# Patient Record
Sex: FEMALE | Race: WHITE
[De-identification: names, ages, dates, MRNs, and addresses within clinical notes are randomized per-mention and may not be internally consistent; named-entity substitution may affect disease eponyms.]

---

## 2018-12-06 ENCOUNTER — HOSPITAL ENCOUNTER (EMERGENCY)
Dept: HOSPITAL 88 - FSED | Age: 73
Discharge: LEFT BEFORE DISCHARGE | End: 2018-12-06
Payer: MEDICARE

## 2018-12-06 VITALS — SYSTOLIC BLOOD PRESSURE: 148 MMHG | DIASTOLIC BLOOD PRESSURE: 70 MMHG

## 2018-12-06 VITALS — WEIGHT: 206.44 LBS | BODY MASS INDEX: 35.24 KG/M2 | HEIGHT: 64 IN

## 2018-12-06 DIAGNOSIS — R06.00: Primary | ICD-10-CM

## 2018-12-06 DIAGNOSIS — I50.9: ICD-10-CM

## 2018-12-06 DIAGNOSIS — Z86.718: ICD-10-CM

## 2018-12-06 DIAGNOSIS — I10: ICD-10-CM

## 2018-12-06 DIAGNOSIS — E05.90: ICD-10-CM

## 2018-12-06 DIAGNOSIS — I48.91: ICD-10-CM

## 2018-12-06 DIAGNOSIS — R05: ICD-10-CM

## 2018-12-06 DIAGNOSIS — J15.9: ICD-10-CM

## 2018-12-06 PROCEDURE — 96374 THER/PROPH/DIAG INJ IV PUSH: CPT

## 2018-12-06 PROCEDURE — 93005 ELECTROCARDIOGRAM TRACING: CPT

## 2018-12-06 PROCEDURE — 87071 CULTURE AEROBIC QUANT OTHER: CPT

## 2018-12-06 PROCEDURE — 80053 COMPREHEN METABOLIC PANEL: CPT

## 2018-12-06 PROCEDURE — 80048 BASIC METABOLIC PNL TOTAL CA: CPT

## 2018-12-06 PROCEDURE — 87400 INFLUENZA A/B EACH AG IA: CPT

## 2018-12-06 PROCEDURE — 71046 X-RAY EXAM CHEST 2 VIEWS: CPT

## 2018-12-06 PROCEDURE — 85379 FIBRIN DEGRADATION QUANT: CPT

## 2018-12-06 PROCEDURE — 85610 PROTHROMBIN TIME: CPT

## 2018-12-06 PROCEDURE — 83880 ASSAY OF NATRIURETIC PEPTIDE: CPT

## 2018-12-06 PROCEDURE — 80076 HEPATIC FUNCTION PANEL: CPT

## 2018-12-06 PROCEDURE — 96375 TX/PRO/DX INJ NEW DRUG ADDON: CPT

## 2018-12-06 PROCEDURE — 87040 BLOOD CULTURE FOR BACTERIA: CPT

## 2018-12-06 PROCEDURE — 85025 COMPLETE CBC W/AUTO DIFF WBC: CPT

## 2018-12-06 PROCEDURE — 87205 SMEAR GRAM STAIN: CPT

## 2018-12-06 PROCEDURE — 84484 ASSAY OF TROPONIN QUANT: CPT

## 2018-12-06 PROCEDURE — 99283 EMERGENCY DEPT VISIT LOW MDM: CPT

## 2018-12-06 NOTE — XMS REPORT
Clinical Summary

                             Created on: 2018



Kathie Ibarra Carmelita

External Reference #: [id number]

: 1945

Sex: Female



Demographics







                          Address                   [address] [place]  [address]

 

                          Home Phone                [phone]

 

                          Preferred Language        English

 

                          Marital Status            Unknown

 

                          Sabianism Affiliation     Taoist

 

                          Race                      White

 

                          Ethnic Group              Non-





Author







                          Author                    MARIA FERNANDA [employer]

 

                          Organization              [facility]

 

                          Address                   Unknown

 

                          Phone                     Unavailable







Support







                Name            Relationship    Address         Phone

 

                    Keisha Ndiaye    [employer]                [address] [place]  [address]                      [phone]







Care Team Providers







                    Care Team Member Name    Role                Phone

 

                    Bc Breaux MD    PCP                 [phone]







Allergies







                                        Comments



                 Active Allergy     Reactions       Severity        Noted Date 

 

                                        



Hives



                           Morphine Sulfate          High  

 

                                        



shortness of breath



                                         Nitrofurantoin    



                                         Macrocrystalline    







Medications







                          End Date                  Status



              Medication     Sig          Dispensed     Refills      Start  



                                         Date  

 

                                                    Active



                 metOLazone (ZAROXOLYN)     Take 2.5 mg      0                 



                     2.5 MG tabletIndications:     by mouth as         7  



                           Essential hypertension     needed (Once     



                           with goal blood pressure     weekly per     



                           less than 130/80          Dr. Henriquez)     



                                         .     

 

                                                    Active



                 levalbuterol (XOPENEX)     Take 3 mLs by      0               02/10/201  



                     1.25 mg/3 mL nebulizer     nebulization        8  



                           solution                  3 (three)     



                                         times daily     



                                         as needed.     

 

                          2019                Active



              levalbuterol (XOPENEX     Inhale 2     1 Inhaler     0              



                     HFA) 45 mcg/actuation     puffs by            8  



                           inhalerIndications:       mouth via     



                           Bronchitis                inhaler every     



                                         6 (six) hours     



                                         as needed for     



                                         Wheezing.     

 

                                                    Active



              nebulizers (NEBULIZER)     Nebulizer and     1 each       0              



                     MiscIndications:     mask for            8  



                           Bronchitis                levalbuterol.     

 

                                                    Active



                     losartan (COZAAR) 50 MG     Take 50 mg by       0   



                           tablet                    mouth nightly     



                                         .     

 

                          2019                Active



              rosuvastatin (CRESTOR) 10     Take 1 tablet     90 tablet     3              



                     MG tablet           (10 mg total)       8  



                                         by mouth     



                                         nightly.     

 

                                                    Active



              warfarin (COUMADIN) 5 MG     TAKE 1 TABLET     90 tablet     3              



                     tabletIndications:     BY MOUTH            8  



                           Essential hypertension     DAILY     



                                         with goal blood pressure      



                                         less than 130/80      

 

                                                    Active



              levothyroxine (SYNTHROID,     TAKE 1 TABLET     90 tablet     2              



                     LEVOTHROID) 50 MCG tablet     BY MOUTH            8  



                                         EVERY MORNING     



                                         ON AN EMPTY     



                                         STOMACH     

 

                                                    Active



              furosemide (LASIX) 40 MG     TAKE 2       180 tablet     1              



                     tablet              TABLETS BY          8  



                                         MOUTH  DAILY     

 

                                                    Active



              spironolactone     TAKE 1 TABLET     90 tablet     1              



                     (ALDACTONE) 25 MG     BY MOUTH            8  



                           tabletIndications:        DAILY     



                                         Essential hypertension      



                                         with goal blood pressure      



                                         less than 130/80      

 

                                                    Active



                     FUROSEMIDE ORAL     Take 60 mg by       0   



                                         mouth every     



                                         morning In     



                                         addition to     



                                         the 40 mg at     



                                         night. .     

 

                                                    Active



              carvedilol (COREG) 25 MG     TAKE 2       360 tablet     1              



                     tablet              TABLETS BY          8  



                                         MOUTH 2     



                                         TIMES DAILY     



                                         WITH     



                                         BREAKFAST     



                                         AND DINNER.     

 

                                                    Active



              traZODone (DESYREL) 100     TAKE 1 TABLET     90 tablet     1              



                     MG tablet           BY MOUTH            8  



                                         NIGHTLY     

 

                          2017                Discontinued



              carvedilol (COREG) 25 MG     Take 2       360 tablet     3              



                     tablet              tablets (50         6  



                                         mg total) by     



                                         mouth 2 (two)     



                                         times daily     



                                         with     



                                         breakfast and     



                                         dinner.     

 

                          2018                Discontinued



              furosemide (LASIX) 40 MG     Take 2       180 tablet     3              



                     tablet              tablets (80         7  



                                         mg total) by     



                                         mouth daily.     

 

                          2018                Discontinued



              levothyroxine (SYNTHROID,     Take 1 tablet     90 tablet     3              



                     LEVOTHROID) 50 MCG tablet     (50 mcg             7  



                                         total) by     



                                         mouth Every     



                                         morning on an     



                                         empty     



                                         stomach.     

 

                          2018                Discontinued



              warfarin (COUMADIN) 5 MG     Take 1 tablet     90 tablet     3            10/02/201  



                     tabletIndications:     (5 mg total)        7  



                           Essential hypertension     by mouth     



                           with goal blood pressure     daily.     



                                         less than 130/80      

 

                          2018                Discontinued



              traZODone (DESYREL) 100     Take 1 tablet     90 tablet     3            10/02/201  



                     MG tabletIndications:     (100 mg             7  



                           Essential hypertension     total) by     



                           with goal blood pressure     mouth     



                           less than 130/80          nightly.     

 

                          2018                Discontinued



              spironolactone     Take 1 tablet     90 tablet     1            10/02/201  



                     (ALDACTONE) 25 MG     (25 mg total)       7  



                           tabletIndications:        by mouth     



                           Essential hypertension     daily.     



                                         with goal blood pressure      



                                         less than 130/80      

 

                          2018                Discontinued



              losartan (COZAAR) 100 MG     Take 1 tablet     90 tablet     3            10/02/201  



                     tabletIndications:     (100 mg             7  



                           Essential hypertension     total) by     



                           with goal blood pressure     mouth daily.     



                                         less than 130/80      

 

                          10/02/2018                



              budesonide-formoterol     Inhale 2     2 Inhaler     12           10/02/201  



                     (SYMBICORT) 80-4.5     puffs by            7  



                           mcg/actuation             mouth via     



                           inhalerIndications:       inhaler 2     



                           Essential hypertension     (two) times     



                           with goal blood pressure     daily.     



                                         less than 130/80      

 

                          2018                Discontinued



              carvedilol (COREG) 25 MG     TAKE 2       360 tablet     2              



                     tablet              TABLETS BY          7  



                                         MOUTH 2     



                                         TIMES DAILY     



                                         WITH     



                                         BREAKFAST     



                                         AND DINNER.     

 

                          2018                



                 cefuroxime (CEFTIN) 500     Take 1 tablet      0               02/10/201  



                     MG tablet           by mouth 2          8  



                                         (two) times     



                                         daily.     

 

                          2018                



              predniSONE (DELTASONE) 20     Take 2       10 tablet     0              



                     MG tabletIndications:     tablets (40         8  



                           Bronchitis                mg total) by     



                                         mouth daily     



                                         for 5 days.     

 

                          2018                Discontinued



              levothyroxine (SYNTHROID,     TAKE 1 TABLET     90 tablet     2              



                     LEVOTHROID) 50 MCG tablet     BY MOUTH            8  



                                         EVERY MORNING     



                                         ON AN EMPTY     



                                         STOMACH     

 

                          2018                Discontinued



              furosemide (LASIX) 40 MG     TAKE 2       180 tablet     2              



                     tablet              TABLETS BY          8  



                                         MOUTH  DAILY     

 

                          2018                Discontinued



              spironolactone     TAKE 1 TABLET     90 tablet     2              



                     (ALDACTONE) 25 MG     BY MOUTH            8  



                           tabletIndications:        DAILY     



                                         Essential hypertension      



                                         with goal blood pressure      



                                         less than 130/80      

 

                          2018                



              cephalexin (KEFLEX) 500     Take 1       21 capsule     0              



                     MG capsule          capsule (500        8  



                                         mg total) by     



                                         mouth 3     



                                         (three) times     



                                         daily for 7     



                                         days.     

 

                          2018                



              bacitracin 500 unit/gram     Apply        120 g        0              



                     ointment            topically 2         8  



                                         (two) times     



                                         daily for 10     



                                         days.     

 

                          2018                Discontinued



              carvedilol (COREG) 25 MG     TAKE 2       360 tablet     1              



                     tablet              TABLETS BY          8  



                                         MOUTH 2     



                                         TIMES DAILY     



                                         WITH     



                                         BREAKFAST     



                                         AND DINNER.     

 

                          2018                Discontinued



              traZODone (DESYREL) 100     TAKE 1 TABLET     90 tablet     1              



                     MG tabletIndications:     BY MOUTH            8  



                           Essential hypertension     NIGHTLY     



                                         with goal blood pressure      



                                         less than 130/80      

 

                          10/18/2018                



              cefUROXime (CEFTIN) 250     Take 1 tablet     20 tablet     0            10/08/201  



                     MG tablet           (250 mg             8  



                                         total) by     



                                         mouth 2 (two)     



                                         times daily     



                                         for 10 days.     







Active Problems







                                        Patient Care Coordination Note

 

                                        



Dec 2014 new pleural effusion, more on the right, improved after 20 pound 
diuresis

Kids are medical POA, some are here. 

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

Home machine. Some self mgt.

Daughter is General Surgeon in [place] 











 



                           Problem                   Noted Date

 

 



                           Pure hypercholesterolemia     2018

 

 



                                         Overview:



                                          Oct 2017, had been good prior to that.



                                         Agreed to start rosuvastatin May 2018

 

 



                           Bilateral edema of lower extremity     2017

 

 



                                         Overview:



                                         Right heart failure, Furosemide. Dr. Henriquez sent to Dr. Rivera, no



                                         procedure, did not look like would be help. She does not want lymphedema



                                         clinic.

 

 



                           Anemia                    10/12/2015

 

 



                                         Overview:



                                         Low platelets 113, low lymphocytes, WBC 3.91, HB 10.4. Dr. Hernadez believes



                                         is following. Not low on iron.



                                         Bled during turbinate procedure on Lovenox

 

 



                           Pancytopenia              2015

 

 



                                         Overview:



                                         2015, prev anemia and mild thrombocytopenia, now with pancytopenia.

 

 



                           Abnormal CXR              06/15/2015

 

 



                                         Overview:



                                         Dr. Jackson following right middle lobe opacity. Follow up pending.

 

 



                           Venous insufficiency      2015

 

 



                                         Overview:



                                         S/p bilateral venous sclerotherapy Bianchi Vein, see media. Some



                                         persistent retrograde flow

 

 



                           Right heart failure due to pulmonary hypertension     2015

 

 



                                         Overview:



                                         Normal EF and NST Dr. Henriquez 2015



                                         EF 50-55%. Marked Pulm HTN<



                                         Severe tricuspid regurgitation.

 

 



                           Proteinuria               2015

 

 



                                         Overview:



                                         296 2015

 

 



                           Pleural effusion, bilateral     2014

 

 



                                         Overview:



                                         New moderate right pleural effusion with right retrocardiac opacity



                                         and no mediastinal shift. Slight enlargement of small left pleural



                                         effusion. No pulmonary edema or pneumothorax. Normal



                                         cardiomediastinal contours. Left-sided dual-lead pacemaker is



                                         unchanged.

 

 



                           Multinodular goiter       2014

 

 



                                         Overview:



                                         [place] 

 

 



                           Pulmonary hypertension, primary     2013

 

 



                                         Overview:



                                         Apparent primary followed by Dr. Henriquez, Prev pulmonary emboli. History of



                                         pneumonia  [facility]



                                         Following expectantly. Overall function is very good.

 

 



                           Systolic murmur           2013

 

 



                                         Overview:



                                         2/6 STEPHANIA

 

 



                           Bilateral renal cysts     2013

 

 



                           Essential hypertension with goal blood pressure less than 130/80     2012

 

 



                                         Warfarin anticoagulation 

 

 



                                         Overview:



                                         History of PEs, mangaged  by [facility]

 

 



                                         S/P ablation of atrial fibrillation 

 

 



                                         Overview:



                                         Still in a-fib 2013 Dr. Rosenberg, no chronic A-fib, Carvedilol



                                         Dr.. Henriquez,



                                         Pacemaker.

 

 



                                         Pacemaker 

 

 



                                         Overview:



                                         Dr. Rosenberg







Resolved Problems







  



                     Problem             Noted Date          Resolved Date

 

  



                     Bronchitis          2018          10/08/2018

 

 



                                         Overview:



                                         2018 ([place]).







Encounters







                          Care Team                 Description



                     Date                Type                Specialty  

 

                                        



Bc Breaux MD                   



                     2018          Refill              Internal Medicine  

 

                                        



Bc Breaux MD                   



                     2018          Orders Only         Internal Medicine  

 

                                        



Bc Breaux MD                   



                     10/30/2018          Orders Only         Internal Bc Sow MD                  Coagulation Disorder



                     10/23/2018          Telephone           Internal Bc Sow MD                   



                     10/23/2018          Orders Only         Internal Bc Sow MD                  CC (OV notes and labs sent )



                     10/19/2018          Telephone           Internal Bc Sow MD                   



                     10/16/2018          Orders Only         Internal Bc Sow MD                  Rx



                     10/09/2018          Telephone           Internal Medicine  

 

                                        



Bc Breaux MD                   



                     10/09/2018          Orders Only         Internal Bc Sow MD                  Pulmonary hypertension, primary (HCC) (Primary Dx); 

Need for influenza vaccination; 

Annual physical exam; 

Pure hypercholesterolemia; 

Right heart failure due to pulmonary hypertension (HCC); 

S/P ablation of atrial fibrillation; 

Warfarin anticoagulation; 

Essential hypertension with goal blood pressure less than 130/80; 

Multinodular goiter; 

Encounter for long-term (current) use of medications; 

Bilateral edema of lower extremity



                     10/08/2018          Office Visit        Internal Bc Sow MD                   



                     10/02/2018          Orders Only         Internal Bc Sow MD                  INR



                     10/02/2018          Telephone           Internal Bc Sow MD                   



                     2018          Orders Only         Bc Eddy MD                  Coagulation Disorder



                     2018          Telephone           Internal Bc Sow MD                   



                     2018          Orders Only         Internal Bc Sow MD                  Coagulation Disorder



                     2018          Telephone           Internal Medicine  

 

                                        



Favio Rivera MD               Leg mass, left



                     2018          Hospital            Radiology  



                                         Encounter   

 

                                        



Favio Rivera MD               Leg mass, left (Primary Dx)



                           2018                Outside Orders   

 

                                        



Bc Breaux MD                  Coagulation Disorder



                     2018          Telephone           Internal Bc Sow MD                   



                     2018          Orders Only         Internal Medicine  

 

                                        



Bc Breaux MD                   



                     2018          Orders Only         Internal Medicine  

 

                                        



Bc Breaux MD                   



                     2018          Orders Only         Internal Medicine  

 

                                        



Bc Breaux MD                   



                     2018          Orders Only         Internal Medicine  

 

                                        



Bc Breaux MD                  Essential hypertension with goal blood pressure less than

 130/80



                     2018          Refill              Internal Medicine  

 

                                        



Bc Breaux MD                   



                     2018          Orders Only         Internal Medicine  

 

                                        



Bc Breaux MD                  Coagulation Disorder



                     2018          Telephone           Internal Medicine  

 

                                        



Bc Breaux MD                   



                     2018          Orders Only         Internal Medicine  

 

                                        



Bc Breaux MD                   



                     2018          Orders Only         Internal Medicine  

 

                                        



Bc Breaux MD                   



                     07/10/2018          Orders Only         Internal Medicine  

 

                                        



Bc Breaux MD                  Coagulation Disorder



                     07/10/2018          Telephone           Internal Bc Sow MD                  Coagulation Disorder (OUT OF range INR)



                     2018          Telephone           Internal Bc Sow MD                   



                     2018          Orders Only         Internal Medicine  

 

                                        



Bc Breaux MD                  Right heart failure due to pulmonary hypertension (HCC) (Primary

 Dx); 

Warfarin anticoagulation; 

Pure hypercholesterolemia; 

S/P ablation of atrial fibrillation; 

Pulmonary hypertension, primary (HCC); 

Venous insufficiency; 

Bronchitis



                     2018          Office Visit        Internal Medicine  

 

                                        



Bc Breaux MD                  Results (INR results   )



                     2018          Telephone           Internal Bc Sow MD                   



                     2018          Orders Only         Internal Bc Sow MD                   



                     06/15/2018          Orders Only         Internal Bc Sow MD                  Cough; URI



                     2018          Telephone           Internal Bc Sow MD                   



                     2018          Orders Only         Internal Medicine  

 

                                        



Bc Breaux MD                  Coagulation Disorder (INR)



                     2018          Telephone           Internal Medicine  

 

                                        



Bc Breaux MD                   



                     2018          Orders Only         Internal Bc Sow MD                   



                     2018          Orders Only         Internal Medicine  

 

                                        



Bc Breaux MD                  Essential hypertension with goal blood pressure less than

 130/80



                     2018          Refill              Internal Bc Sow MD                   



                     05/15/2018          Orders Only         Internal Medicine  

 

                                        



Bc Breaux MD                   



                     2018          Orders Only         Internal Medicine  

 

                                        



Bc Breaux MD                  Coagulation Disorder



                     2018          Telephone           Internal Medicine  

 

                                        



Bc Breaux MD                   



                     2018          Orders Only         Internal Medicine  

 

                                        



Bc Breaux MD                   



                     2018          Orders Only         Internal Medicine  

 

                                        



Bc Breaux MD                  records request (cardio work up )



                     2018          Telephone           Internal Medicine  

 

                                        



Bc Breaux MD                   



                     2018          Orders Only         Internal Medicine  

 

                                        



Bc Breaux MD                   



                     04/10/2018          Orders Only         Internal Medicine  

 

                                        



Bc Breaux MD                  Coagulation Disorder



                     2018          Telephone           Internal Medicine  

 

                                        



Bc Breaux MD                   



                     2018          Orders Only         Internal Medicine  

 

                                        



Bc Breaux MD                  Pulmonary hypertension, primary (HCC) (Primary Dx); 

S/P ablation of atrial fibrillation; 

Pure hypercholesterolemia; 

Warfarin anticoagulation; 

Right heart failure due to pulmonary hypertension (HCC); 

Venous insufficiency; 

Other iron deficiency anemia



                     2018          Office Visit        Internal Medicine  

 

                                        



Baljeet Gaston MD                       Diphtheria-tetanus-pertussis (DTP) vaccination (Primary Dx); 

Laceration of left lower leg, initial encounter; 

History of CHF (congestive heart failure)



                     2018          Emergency           Emergency Medicine  

 

                                        



Bc Breaux MD                  Concern about patient (Concerned about patient)



                     2018          Telephone           Internal Medicine  

 

                                        



Bc Breaux MD                   



                     2018          Orders Only         Internal Medicine  

 

                                        



Bc Breaux MD                   



                     2018          Orders Only         Internal Medicine  

 

                                        



Bc Breaux MD                  Essential hypertension with goal blood pressure less than

 130/80



                     2018          Refill              Internal Medicine  

 

                                        



Bc Breaux MD                   



                     2018          Orders Only         Internal Medicine  

 

                                        



Bc Breaux MD                   



                     2018          Orders Only         Internal Medicine  

 

                                        



Bc Breaux MD                   



                     2018          Orders Only         Internal Medicine  

 

                                        



Catarina Fox MD                  Bronchitis (Primary Dx)



                     2018          Office Visit        Internal Medicine  

 

                                        



Bc Breaux MD                  Rx (Rx ); NEW NEBULIZER MACHINE



                     2018          Telephone           Internal Medicine  

 

                                        



Bc Breaux MD                   



                     2018          Orders Only         Internal Medicine  

 

                                        



Bc Breaux MD                  INR



                     2018          Telephone           Internal Medicine  

 

                                        



Bc Breaux MD                   



                     2018          Orders Only         Internal Medicine  

 

                                        



Bc Breaux MD                   



                     2018          Orders Only         Internal Medicine  

 

                                        



Bc Breaux MD                   



                     2018          Orders Only         Internal Medicine  

 

                                        



Bc Breaux MD                   



                     2018          Orders Only         Internal Medicine  

 

                                        



Bc Breaux MD                  Coagulation Disorder (INR out of range 1.9)



                     2018          Telephone           Internal Bc Sow MD                   



                     2018          Orders Only         Internal Medicine  

 

                                        



Bc Breaux MD                   



                     2017          Orders Only         Internal Bc Sow MD                   



                     2017          Refill              Internal Bc Sow MD                   



                     2017          Orders Only         Internal Medicine  

 

                                        



Bc Breaux MD                   



                     2017          Orders Only         Internal Medicine  



after 2017



Immunizations







  



                     Name                Dates Previously Given     Next Due

 

  



                           Influenza High Dose       10/08/2018, 10/02/2017 



                                         Preservative Free IM  

 

  



                           Influenza, High Dose      10/01/2014 



                                         Seasonal  

 

  



                           Pneumococcal Conjugate     10/02/2017 



                                         (Prevnar) 13-Valent  

 

  



                           Pneumococcal              2012 



                                         Polysaccharide  



                                         (Pneumovax)  

 

  



                           Tdap                      2018 







Family History







   



                 Relation        Name            Status          Comments

 

   



                     Brother                         COPD



                                         (Age 70) 

 

   



                     Daughter            Alive               Healthy

 

   



                     Father                          Stroke



                                         (Age 65) 

 

   



                     Mother                          Stroke



                                         (Age 57) 

 

   



                     Son                 Alive               Healthy







Social History







                                        Date



                 Tobacco Use     Types           Packs/Day       Years Used 

 

                                         



                                         Never Smoker    

 

    



                                         Smokeless Tobacco: Never   



                                         Used   









   



                 Alcohol Use     Drinks/Week     oz/Week         Comments

 

   



                                         No   









 



                           Sex Assigned at Birth     Date Recorded

 

 



                                         Not on file 









                                        Industry



                           Job Start Date            Occupation 

 

                                        Not on file



                           Not on file               Not on file 









                                        Travel End



                           Travel History            Travel Start 

 





                                         No recent travel history available.







Last Filed Vital Signs







                                        Time Taken



                           Vital Sign                Reading 

 

                                        10/08/2018 10:40 AM CDT



                           Blood Pressure            106/58 

 

                                        10/08/2018 10:40 AM CDT



                           Pulse                     78 

 

                                        10/08/2018 10:40 AM CDT



                           Temperature               36.5 C (97.7 F) 

 

                                        2018 12:55 PM CDT



                           Respiratory Rate          20 

 

                                        10/08/2018 10:40 AM CDT



                           Oxygen Saturation         99% 

 

                                        -



                           Inhaled Oxygen            - 



                                         Concentration  

 

                                        10/08/2018 10:40 AM CDT



                           Weight                    89.9 kg (198 lb 4.8 oz) 

 

                                        10/08/2018 10:40 AM CDT



                           Height                    162.6 cm (5' 4") 

 

                                        10/08/2018 10:40 AM CDT



                           Body Mass Index           34.04 







Plan of Treatment







                          Care Team                 Description



                     Date                Type                Specialty  

 

                                        



Bc Breaux MD



[address] [id number] [phone] (Fax)                       



                     04/15/2019          Office Visit        Internal Medicine  









   



                 Health Maintenance     Due Date        Last Done       Comments

 

   



                     INFLUENZA VACCINE     Completed           10/08/2018, 10/02/2017 







Procedures







                                        Comments



                 Procedure Name     Priority        Date/Time       Associated Diagnosis 

 

                                        



Results for this procedure are in the results section.



                     PROTHROMBIN TIME/INR     Routine             2018  

 

                                        



Results for this procedure are in the results section.



                     PROTHROMBIN TIME/INR     Routine             10/30/2018  

 

                                        



Results for this procedure are in the results section.



                     PROTHROMBIN TIME/INR     Routine             10/23/2018  

 

                                        



Results for this procedure are in the results section.



                     PROTHROMBIN TIME/INR     Routine             10/16/2018  

 

                                        



Results for this procedure are in the results section.



                     PROTHROMBIN TIME/INR     Routine             10/09/2018  

 

                                        



Results for this procedure are in the results section.



                     MICROSCOPIC EXAMINATION     Routine             10/08/2018  



                                         11:26 AM CDT  

 

                                        



Results for this procedure are in the results section.



                 UA/M W/RFLX CULTURE, ROUT     AP Routine      10/08/2018      Pulmonary hypertension, 



                           11:26 AM CDT              primary (HCC) 



                                         Pure hypercholesterolemia 



                                         Multinodular goiter 



                                         Encounter for long-term 



                                         (current) use of 



                                         medications 

 

                                        



Results for this procedure are in the results section.



                 VITAMIN D, 25-HYDROXY     Routine         10/08/2018      Pulmonary hypertension, 



                           11:26 AM CDT              primary (HCC) 



                                         Pure hypercholesterolemia 



                                         Multinodular goiter 



                                         Encounter for long-term 



                                         (current) use of 



                                         medications 

 

                                        



Results for this procedure are in the results section.



                 LIPID PANEL     Routine         10/08/2018      Pulmonary hypertension, 



                           11:26 AM CDT              primary (HCC) 



                                         Pure hypercholesterolemia 



                                         Multinodular goiter 



                                         Encounter for long-term 



                                         (current) use of 



                                         medications 

 

                                        



Results for this procedure are in the results section.



                 HGB A1C WITH EAG ESTIMA     Routine         10/08/2018      Pulmonary hypertension, 



                           11:26 AM CDT              primary (HCC) 



                                         Pure hypercholesterolemia 



                                         Multinodular goiter 



                                         Encounter for long-term 



                                         (current) use of 



                                         medications 

 

                                        



Results for this procedure are in the results section.



                 CBC W/PLT COUNT & AUTO     Routine         10/08/2018      Pulmonary hypertension, 



                     DIFFERENTIAL        11:26 AM CDT        primary (HCC) 



                                         Pure hypercholesterolemia 



                                         Multinodular goiter 



                                         Encounter for long-term 



                                         (current) use of 



                                         medications 

 

                                        



Results for this procedure are in the results section.



                 COMPREHENSIVE METABOLIC     Routine         10/08/2018      Pulmonary hypertension, 



                     PANEL               11:26 AM CDT        primary (HCC) 



                                         Pure hypercholesterolemia 



                                         Multinodular goiter 



                                         Encounter for long-term 



                                         (current) use of 



                                         medications 

 

                                        



Results for this procedure are in the results section.



                     PROTHROMBIN TIME/INR     Routine             10/02/2018  

 

                                         



                     PROTHROMBIN TIME/INR     Routine             2018  

 

                                        



Results for this procedure are in the results section.



                     PROTHROMBIN TIME/INR     Routine             2018  

 

                                        



Results for this procedure are in the results section.



                     POCT-CREATININE     Routine             2018  



                                         2:26 PM CDT  

 

                                        



Results for this procedure are in the results section.



                     PROTHROMBIN TIME/INR     Routine             2018  

 

                                        



Results for this procedure are in the results section.



                     PROTHROMBIN TIME/INR     Routine             2018  

 

                                        



Results for this procedure are in the results section.



                     PROTHROMBIN TIME/INR     Routine             2018  

 

                                        



Results for this procedure are in the results section.



                     PROTHROMBIN TIME/INR     Routine             2018  

 

                                        



Results for this procedure are in the results section.



                     PROTHROMBIN TIME/INR     Routine             2018  

 

                                        



Results for this procedure are in the results section.



                     PROTHROMBIN TIME/INR     Routine             2018  

 

                                        



Results for this procedure are in the results section.



                     PROTHROMBIN TIME/INR     Routine             2018  

 

                                        



Results for this procedure are in the results section.



                     MM DIGITAL MAMMO SCREEN     Routine             2018  



                                         BILATERAL    

 

                                         



                     MM DIGITAL MAMMO SCREEN     Routine             2018  



                                         BILATERAL    

 

                                         



                     MM DIGITAL MAMMO SCREEN     Routine             2018  



                                         BILATERAL    

 

                                         



                     PROTHROMBIN TIME/INR     Routine             07/10/2018  

 

                                        



Results for this procedure are in the results section.



                     PROTHROMBIN TIME/INR     Routine             2018  

 

                                        



Results for this procedure are in the results section.



                     PROTHROMBIN TIME/INR     Routine             2018  

 

                                         



                     PROTHROMBIN TIME/INR     Routine             06/15/2018  

 

                                        



Results for this procedure are in the results section.



                     PROTHROMBIN TIME/INR     Routine             2018  

 

                                        



Results for this procedure are in the results section.



                     PROTHROMBIN TIME/INR     Routine             2018  

 

                                        



Results for this procedure are in the results section.



                     PROTHROMBIN TIME/INR     Routine             2018  

 

                                        



Results for this procedure are in the results section.



                     PROTHROMBIN TIME/INR     Routine             05/15/2018  

 

                                        



Results for this procedure are in the results section.



                     PROTHROMBIN TIME/INR     Routine             2018  

 

                                        



Results for this procedure are in the results section.



                     PROTHROMBIN TIME/INR     Routine             2018  

 

                                        



Results for this procedure are in the results section.



                     PROTHROMBIN TIME/INR     Routine             2018  

 

                                        



Results for this procedure are in the results section.



                     PROTHROMBIN TIME/INR     Routine             2018  

 

                                        



Results for this procedure are in the results section.



                     CHG PROTHROMBIN TIME     Routine             04/10/2018  

 

                                         



                     PROTHROMBIN TIME/INR     Routine             2018  

 

                                        



Results for this procedure are in the results section.



                     MICROSCOPIC EXAMINATION     Routine             2018  



                                         11:15 AM CDT  

 

                                        



Results for this procedure are in the results section.



                 UA/M W/RFLX CULTURE, ROUT     AP Routine      2018      Pure hypercholesterolemia

 



                           11:15 AM CDT              Warfarin anticoagulation 



                                         Right heart failure due 



                                         to pulmonary hypertension 



                                         (HCC) 



                                         Other iron deficiency 



                                         anemia 

 

                                        



Results for this procedure are in the results section.



                 T4, FREE        Routine         2018      Pure hypercholesterolemia 



                           11:15 AM CDT              Warfarin anticoagulation 



                                         Right heart failure due 



                                         to pulmonary hypertension 



                                         (HCC) 



                                         Other iron deficiency 



                                         anemia 

 

                                        



Results for this procedure are in the results section.



                 TSH             Routine         2018      Pure hypercholesterolemia 



                           11:15 AM CDT              Warfarin anticoagulation 



                                         Right heart failure due 



                                         to pulmonary hypertension 



                                         (HCC) 



                                         Other iron deficiency 



                                         anemia 

 

                                        



Results for this procedure are in the results section.



                 IRON, TIBC, % SAT.     Routine         2018      Pure hypercholesterolemia 



                     (WITHOUT FERRITIN)      11:15 AM CDT        Warfarin anticoagulation 



                                         Right heart failure due 



                                         to pulmonary hypertension 



                                         (HCC) 



                                         Other iron deficiency 



                                         anemia 

 

                                        



Results for this procedure are in the results section.



                 FERRITIN        Routine         2018      Pure hypercholesterolemia 



                           11:15 AM CDT              Warfarin anticoagulation 



                                         Right heart failure due 



                                         to pulmonary hypertension 



                                         (HCC) 



                                         Other iron deficiency 



                                         anemia 

 

                                        



Results for this procedure are in the results section.



                 LIPID PANEL     Routine         2018      Pure hypercholesterolemia 



                           11:15 AM CDT              Warfarin anticoagulation 



                                         Right heart failure due 



                                         to pulmonary hypertension 



                                         (HCC) 



                                         Other iron deficiency 



                                         anemia 

 

                                        



Results for this procedure are in the results section.



                 HGB A1C WITH EAG ESTIMA     Routine         2018      Pure hypercholesterolemia 



                           11:15 AM CDT              Warfarin anticoagulation 



                                         Right heart failure due 



                                         to pulmonary hypertension 



                                         (HCC) 



                                         Other iron deficiency 



                                         anemia 

 

                                        



Results for this procedure are in the results section.



                 COMPREHENSIVE METABOLIC     Routine         2018      Pure hypercholesterolemia 



                     PANEL               11:15 AM CDT        Warfarin anticoagulation 



                                         Right heart failure due 



                                         to pulmonary hypertension 



                                         (HCC) 



                                         Other iron deficiency 



                                         anemia 

 

                                        



Results for this procedure are in the results section.



                 CBC W/PLT COUNT & AUTO     Routine         2018      Pure hypercholesterolemia 



                     DIFFERENTIAL        11:15 AM CDT        Warfarin anticoagulation 



                                         Right heart failure due 



                                         to pulmonary hypertension 



                                         (HCC) 



                                         Other iron deficiency 



                                         anemia 

 

                                        



Results for this procedure are in the results section.



                     W[place] LAYR CLOS WND     Routine             2018  



                           TRUNK,ARM,LEG 2.6-7.5 CM      7:23 AM CDT  

 

                                        



Results for this procedure are in the results section.



                     PROTHROMBIN TIME/INR     Routine             2018  

 

                                        



Results for this procedure are in the results section.



                     PROTHROMBIN TIME/INR     Routine             2018  

 

                                        



Results for this procedure are in the results section.



                     PROTHROMBIN TIME/INR     Routine             2018  

 

                                        



Results for this procedure are in the results section.



                     PROTHROMBIN TIME/INR     Routine             2018  

 

                                        



Results for this procedure are in the results section.



                     PROTHROMBIN TIME/INR     Routine             2018  

 

                                        



Results for this procedure are in the results section.



                     PROTHROMBIN TIME/INR     Routine             2018  

 

                                        



Results for this procedure are in the results section.



                     PROTHROMBIN TIME/INR     Routine             2018  

 

                                         



                     PROTHROMBIN TIME/INR     Routine             2018  

 

                                        



Results for this procedure are in the results section.



                     PROTHROMBIN TIME/INR     Routine             2018  

 

                                        



Results for this procedure are in the results section.



                     PROTHROMBIN TIME/INR     Routine             2018  

 

                                         



                     PROTHROMBIN TIME/INR     Routine             2018  

 

                                        



Results for this procedure are in the results section.



                     PROTHROMBIN TIME/INR     Routine             2018  

 

                                        



Results for this procedure are in the results section.



                     PROTHROMBIN TIME/INR     Routine             2017  

 

                                        



Results for this procedure are in the results section.



                     PROTHROMBIN TIME/INR     Routine             2017  

 

                                        



Results for this procedure are in the results section.



                     PROTHROMBIN TIME/INR     Routine             2017  

 

                                        



Results for this procedure are in the results section.



                     PROTHROMBIN TIME/INR     Routine             2017  



after 2017



Results

* Prothrombin time/INR (2018)



Only the most recent of 44 results within the time period is included.









                                         Specimen

 





                                         Blood









 



                           Narrative                 Performed At

 

 



                                         This result has an attachment that is not available. 









   



                 Performing Organization     Address         City/[facility]/Zipcode     Phone Number

 

   



                                         OTHER (EXTERNAL)   





* MICROSCOPIC EXAMINATION (10/08/2018 11:26 AM CDT)



Only the most recent of 2 results within the time period is included.





   



                 Component       Value           Ref Range       Performed At

 

   



                 WBC             None seen       0 - 5 /hpf      [facility]

 

   



                 RBC             0-2             0 - 2 /hpf      [facility]

 

   



                 Epithelial Cells (non     0-10            0 - 10 /hpf     [facility]



                                         renal)   

 

   



                 Casts           Present (A)     None seen /lpf     [facility]

 

   



                 Cast Type       Hyaline casts     N/A             [facility]

 

   



                 Mucus Threads     Present         Not Estab.      [facility]

 

   



                 Bacteria        None seen       None seen/      [facility]









 



                           Narrative                 Performed At

 

 



                           Performed at:[facility]     [facility]



                                         [address], [id number] 



                                         : Daniel Kaplan MD, Phone:[phone] 









   



                 Performing Organization     Address         City/State/Zipcode     Phone Number

 

   



                                         [facility]   

 

   



                                         [facility]   





* UA/M W/RFLX CULTURE, ROUT (10/08/2018 11:26 AM CDT)



Only the most recent of 2 results within the time period is included.





   



                 Component       Value           Ref Range       Performed At

 

   



                 [place], UA     1.017           1.005 - 1.03     [facility]

 

   



                 pH, UA          6.5             5.0 - 7.5       [facility]

 

   



                 Color, UA       Yellow          Yellow          [facility]

 

   



                 Appearance      Clear           Clear           [facility]

 

   



                 WBC Esterase     Negative        Negative        [facility]

 

   



                 Protein, UA     Negative        Negative/T      [facility]

 

   



                 Glucose, Urine     Negative        Negative        [facility]

 

   



                 Ketones, UA     Negative        Negative        [facility]

 

   



                 Blood, UA       Negative        Negative        [facility]

 

   



                 Bilirubin, UA     Negative        Negative        [facility]

 

   



                 Urobilinogen,Semi-Qn     0.2             0.2 - 1.0 mg/dL     [facility]

 

   



                 Nitrite, UA     Negative        Negative        [facility]

 

   



                     Microscopic Examination     CommentComment: Microscopic      [facility]



                                         follows if indicated.  

 

   



                     Microscopic Examination     See below:Comment: Microscopic      [facility]



                                         was indicated and was  



                                         performed.  

 

   



                     Urinalysis Reflex     CommentComment: This specimen      [facility]



                                         will not reflex to a Urine  



                                         Culture.  













                                         Specimen

 





                                         Urine - Urine, Clean



                                         Catch









 



                           Narrative                 Performed At

 

 



                           Performed at: - [facility]     [facility]



                                         [address], [id number] 



                                         : Daniel Kaplan MD, Phone:[phone] 









   



                 Performing Organization     Address         City/[facility]     Phone Number

 

   



                                         [facility]   

 

   



                                         [facility]   





* HGB A1C WITH EAG ESTIMA  ([facility] Only) (10/08/2018 11:26 AM CDT)



Only the most recent of 2 results within the time period is included.





   



                 Component       Value           Ref Range       Performed At

 

   



                 Hemoglobin A1c     5.2             4.8 - 5.6 %     [facility]



                                         Comment:  



                                          Prediabetes:  



                                         5.7 - 6.4  



                                          Diabetes:  



                                         >6.4  



                                          Glycemic  



                                         control for adults with  



                                         diabetes: <7.0  

 

   



                 Hemoglobin A1c     103             mg/dL           [facility]









 



                           Narrative                 Performed At

 

 



                           Performed at: - [facility]     [facility]



                                         [address], [id number] 



                                         : Daniel Kaplan MD, Phone:[phone] 









   



                 Performing Organization     Address         City/[facility]     Phone Number

 

   



                                         [facility]   

 

   



                                         [facility]   





* Vitamin D, 25-Hydroxy (10/08/2018 11:26 AM CDT)





   



                 Component       Value           Ref Range       Performed At

 

   



                 Vitamin D, 25-Hydroxy     61.5            30.0 - 100.0 ng/mL     [facility]



                                         Comment:  



                                         Vitamin D deficiency has been  



                                         defined by the [place] of  



                                         Medicine and an Endocrine  



                                         Society practice guideline as  



                                         a  



                                         level of serum 25-OH vitamin D  



                                         less than 20 ng/mL (1,2).  



                                         The Endocrine Society went on  



                                         to further define vitamin D  



                                         insufficiency as a level  



                                         between 21 and 29 ng/mL (2).  



                                         1. IOM ([place] of  



                                         Medicine). 2010. Dietary  



                                         reference  



                                          intakes for calcium and  



                                         D. [place]: The  



                                          National Academies Press.  



                                         2. Sary MF, Eliz NC,  



                                         Jonah HERNANDEZ, et al.  



                                          Evaluation, treatment,  



                                         and prevention of vitamin D  



                                          deficiency: an Endocrine  



                                         Society clinical practice  



                                          guideline. JCEM. 2011; 96(7):1911-30.  













                                         Specimen

 





                                         Blood









 



                           Narrative                 Performed At

 

 



                           Performed at:01 - [facility]     [facility]



                                         [address], [id number] 



                                         : Daniel Kaplan MD, Phone:[phone] 









   



                 Performing Organization     Address         City/[facility]     Phone Number

 

   



                                         [facility]   

 

   



                                         [facility]   





* CBC W/PLT COUNT & AUTO DIFFERENTIAL (10/08/2018 11:26 AM CDT)



Only the most recent of 2 results within the time period is included.





   



                 Component       Value           Ref Range       Performed At

 

   



                 WBC             4.0             3.4 - 10.8 x10E3/uL     [facility]

 

   



                 RBC             3.72 (L)        3.77 - 5.28 x10E6/uL     [facility]

 

   



                 Hemoglobin      11.3            11.1 - 15.9 g/dL     [facility]

 

   



                 Hematocrit      34.7            34.0 - 46.6 %     [facility]

 

   



                 MCV             93              79 - 97 fL      [facility]

 

   



                 MCH             30.4            26.6 - 33.0 pg     [facility]

 

   



                 MCHC            32.6            31.5 - 35.7 g/dL     [facility]

 

   



                 RDW             14.1            12.3 - 15.4 %     [facility]

 

   



                 Platelets       131 (L)         150 - 379 x10E3/uL     [facility]

 

   



                 % Neutros       69              Not Estab. %     [facility]

 

   



                 % Lymphs        20              Not Estab. %     [facility]

 

   



                 % Monos         8               Not Estab. %     [facility]

 

   



                 % Eos           2               Not Estab. %     [facility]

 

   



                 % Baso          1               Not Estab. %     [facility]

 

   



                 # Neutros       2.8             1.4 - 7.0 x10E3/uL     [facility]

 

   



                 # Lymphs        0.8             0.7 - 3.1 x10E3/uL     [facility]

 

   



                 # Monos         0.3             0.1 - 0.9 x10E3/uL     [facility]

 

   



                 # Eos           0.1             0.0 - 0.4 x10E3/uL     [facility]

 

   



                 Baso (Absolute)     0.0             0.0 - 0.2 x10E3/uL     [facility]

 

   



                 % Immature Grans     0               Not Estab. %     [facility]

 

   



                 # Immature Grans     0.0             0.0 - 0.1 x10E3/uL     [facility]













                                         Specimen

 





                                         Blood









 



                           Narrative                 Performed At

 

 



                           Performed at:01 - [facility]



                                         [address], [id number] 



                                         : Daniel Kaplan MD, Phone:[phone] 









   



                 Performing Organization     Address         City/[facility]/Zipcode     Phone Number

 

   



                                         [facility]   

 

   



                                         [facility]   





* Lipid panel (10/08/2018 11:26 AM CDT)



Only the most recent of 2 results within the time period is included.





   



                 Component       Value           Ref Range       Performed At

 

   



                 Cholesterol, Total     126             100 - 199 mg/dL     [facility]

 

   



                 Triglycerides     89              0 - 149 mg/dL     [facility]

 

   



                 HDL Cholesterol     45              >39 mg/dL       [facility]



                                         Comment:  



                                         **Effective 2018,  



                                         HDL Cholesterol**  



                                         reference interval will be  



                                         changing to:  



                                           



                                           



                                         MaleFe  



                                         male  



                                           



                                           



                                         40 - [id number] 50 - [id number]  

 

   



                 VLDL Cholesterol Calin     18              5 - 40 mg/dL     [facility]

 

   



                 LDL Cholesterol Calc     63              0 - 99 mg/dL     [facility]

 

   



                 LDL/HDL Ratio     1.4             0.0 - 3.2 ratio     [facility]



                                         Comment:  



                                           



                                           



                                         LDL/HDL Ratio  



                                           



                                           



                                         Me  



                                         nWomen  



                                           



                                           



                                         1/2 Avg.Risk1.01.5  



                                           



                                           



                                         Avg.Risk3.6  



                                         3.2  



                                           



                                           



                                         2X Avg.Risk6.25.0  



                                           



                                           



                                         3X Avg.Risk8.06.1  













                                         Specimen

 





                                         Blood









 



                           Narrative                 Performed At

 

 



                           Performed at: - [facility]     [facility]



                                         [address], [id number] 



                                         : Daniel Kaplan MD, Phone:[phone] 









   



                 Performing Organization     Address         [facility]     Phone Number

 

   



                                         [facility]   

 

   



                                         [facility]   





* Comprehensive metabolic panel (10/08/2018 11:26 AM CDT)



Only the most recent of 2 results within the time period is included.





   



                 Component       Value           Ref Range       Performed At

 

   



                 Glucose, Serum     100 (H)         65 - 99 mg/dL     [facility]

 

   



                 BUN             53 (H)          8 - 27 mg/dL     [facility]

 

   



                 Creatinine, Serum     1.54 (H)        0.57 - 1.00 mg/dL     [facility]

 

   



                 eGFR If NonAfricn Am     33 (L)          >59 mL/min/1.73     [facility]

 

   



                 eGFR If Africn Am     38 (L)          >59 mL/min/1.73     [facility]

 

   



                 BUN/Creatinine Ratio     34 (H)          12 - 28         [facility]

 

   



                 Sodium, Serum     140             134 - 144 mmol/L     [facility]

 

   



                 Potassium, Serum     4.0             3.5 - 5.2 mmol/L     [facility]

 

   



                 Chloride, Serum     102             96 - 106 mmol/L     [facility]

 

   



                 Carbon Dioxide, Total     25              20 - 29 mmol/L     [facility]

 

   



                 Calcium, Serum     8.2 (L)         8.7 - 10.3 mg/dL     [facility]

 

   



                 Protein, Total, Serum     4.9 (L)         6.0 - 8.5 g/dL     [facility]

 

   



                 Albumin, Serum     3.6             3.5 - 4.8 g/dL     [facility]

 

   



                 Globulin, Total     1.3 (L)         1.5 - 4.5 g/dL     [facility]

 

   



                 A/G Ratio       2.8 (H)         1.2 - 2.2       [facility]

 

   



                 Bilirubin, Total     0.3             0.0 - 1.2 mg/dL     [facility]

 

   



                 Alkaline Phosphatase, S     65              39 - 117 IU/L     [facility]

 

   



                 AST (SGOT)      29              0 - 40 IU/L     [facility]

 

   



                 ALT (SGPT)      17              0 - 32 IU/L     [facility]













                                         Specimen

 





                                         Blood









 



                           Narrative                 Performed At

 

 



                           [facility]



                                         [address] [place], [place] [id number] 



                                         : Daniel Kaplan MD, Phone:[phone] 









   



                 Performing Organization     Address         City[facility]     Phone Number

 

   



                                         [facility]   

 

   



                                         [facility]   





* POC-Creatinine (2018  2:26 PM CDT)





   



                 Component       Value           Ref Range       Performed At

 

   



                 POC-Creatinine     1.9 (H)Comment: TESTED AT     0.6 - 1.3 mg/dL     





                           [facility] [address] [place] [address]



                                         [id number]  

 

   



                 POC-EGFR        26              mL/min/1.73M2     [facility]













                                         Specimen

 





                                         Blood









   



                 Performing Organization     Address         City[facility]     Phone Number

 

   



                 [address] [address] [phone]





                                         [facility]   





* MM digital mammo screen bilateral (2018)



Only the most recent of 3 results within the time period is included.





 



                           Narrative                 Performed At

 

 



                                         This result has an attachment that is not available. 





* CHG PROTHROMBIN TIME (04/10/2018)





 



                           Narrative                 Performed At

 

 



                                         This result has an attachment that is not available. 









   



                 Performing Organization     Address         City[facility]     Phone Number

 

   



                                         MISCELLANEOUS AP   



                                         NON-INTERFACED REFERENCE   



                                         LAB   





* Iron, TIBC, % sat. (without ferritin) (2018 11:15 AM CDT)





   



                 Component       Value           Ref Range       Performed At

 

   



                 Iron Bind.Cap.(TIBC)     314             250 - 450 ug/dL     [facility]

 

   



                 UIBC            244             118 - 369 ug/dL     [facility]

 

   



                 Iron, Serum     70              27 - 139 ug/dL     [facility]

 

   



                 Iron % Saturation     22              15 - 55 %       [facility]













                                         Specimen

 





                                         Blood









 



                           Narrative                 Performed At

 

 



                           Performed at:[address] [place], [place] [id number] 



                                         : Daniel Kaplan MD, Phone:[phone] 









   



                 Performing Organization     Address         City/[facility]     Phone Number

 

   



                                         [facility]   

 

   



                                         [facility]   





* TSH (2018 11:15 AM CDT)





   



                 Component       Value           Ref Range       Performed At

 

   



                 TSH             2.360           0.450 - 4.50 uIU/mL     [facility]













                                         Specimen

 





                                         Blood









 



                           Narrative                 Performed At

 

 



                           Performed at:[address] [place], [place] [id number] 



                                         : Daniel Kaplan MD, Phone:[phone] 









   



                 Performing Organization     Address         City/[facility]     Phone Number

 

   



                                         [facility]   

 

   



                                         [facility]   





* T4, free (2018 11:15 AM CDT)





   



                 Component       Value           Ref Range       Performed At

 

   



                 T4,Free(Direct)     1.37            0.82 - 1.77 ng/dL     [facility]

 

   



                 Thyroxine (T4)     8.7             4.5 - 12.0 ug/dL     [facility]













                                         Specimen

 





                                         Blood









 



                           Narrative                 Performed At

 

 



                           Performed at:[address] [place], [place] [id number] 



                                         : Daniel Kaplan MD, Phone:[phone] 









   



                 Performing Organization     Address         City/[facility]     Phone Number

 

   



                                         [facility]   

 

   



                                         [facility]   





* Ferritin (2018 11:15 AM CDT)





   



                 Component       Value           Ref Range       Performed At

 

   



                 Ferritin, Serum     88              15 - 150 ng/mL     [facility]













                                         Specimen

 





                                         Blood









 



                           Narrative                 Performed At

 

 



                           Performed at:[address] [place], [place] [id number] 



                                         : Daniel Kaplan MD, Phone:[phone] 









   



                 Performing Organization     Address         City/[facility]     Phone Number

 

   



                                         [facility]   

 

   



                                         [facility]   





* LACERATION REPAIR (2018  7:23 AM CDT)





 



                           Narrative                 Performed At

 

 



                                         Baljeet Gaston MD 3/31/41185:23 AM 



                                         Lac Repair 



                                         Date/Time: 3/26/2018 1:30 PM 



                                         Performed by: BALJEET GASTON 



                                         Authorized by: BALJEET GASTON 



                                         Consent: Verbal consent obtained. 



                                         Risks and benefits: risks, benefits and alternatives were discussed 



                                         Consent given by: patient 



                                         Patient understanding: patient states understanding of the procedure being 



                                         performed 



                                         Patient consent: the patient's understanding of the procedure matches 



                                         consent given 



                                         Patient identity confirmed: verbally with patient and arm band 



                                         Time out: Immediately prior to procedure a "time out" was called to verify 



                                         the correct patient, procedure, equipment, support staff and site/side 



                                         marked as required. 



                                         Body area: lower extremity 



                                         Location details: left lower leg 



                                         Laceration length: 4 cm 



                                         Tendon involvement: none 



                                         Nerve involvement: none 



                                         Vascular damage: no 



                                         Anesthesia: local infiltration 



                                         Anesthesia: 



                                         Local Anesthetic: lidocaine 1% without epinephrine 



                                         Anesthetic total: 5 mL 



                                         Sedation: 



                                         Patient sedated: no 



                                         Preparation: Patient was prepped and draped in the usual sterile fashion. 



                                         Irrigation solution: saline 



                                         Irrigation method: syringe 



                                         Amount of cleaning: extensive 



                                         Debridement: none 



                                         Degree of undermining: none 



                                         Skin closure: 4-0 Prolene 



                                         Number of sutures: 6 



                                         Technique: simple 



                                         Approximation: close 



                                         Approximation difficulty: simple 



                                         Dressing: 4x4 sterile gauze, antibiotic ointment, gauze roll and pressure 



                                         dressing 



                                         Patient tolerance: Patient tolerated the procedure well with no immediate 



                                         complications 



                                         Immediate Post-Procedure Note 



                                         Date/Time: 3/26/2018 1:15 PM 



                                         Assistants to the procedure: None 



                                         Pre-procedure diagnosis: laceration LLE 



                                         Post-procedure diagnosis: s/p repair 



                                         Procedures Performed: Lac Repair 



                                         Specimens removed: None 



                                         Estimated blood loss (mL): None 



                                         Complications: None 



                                         Type of anesthesia: None 



                                         Grafts or Implants: None 





after 2017



Insurance







     



            Payer      Benefit     Subscriber ID     Type       Phone      Address



                                         Plan /    



                                         Group    

 

     



                 MEDICARE        MEDICARE A      xxxxxxxxxxx     Medicare  



                                         B    

 

     



                 MCR SUPPLEMENT/INDIVIDUAL     [employer]      xxxxxxxxxxx     [facility]  



                                         D    



                                         HEALTHCARE    









     



            Guarantor Name     Account     Relation to     Date of     Phone      Billing Address



                     Type                Patient             Birth  

 

     



            Kathie Ibarra     Personal/F     Self       1945     [phone] 

[facility]               ([place])              [place] [address]







Advance Directives





For more information, please contact:



[facility]



[address] [place] [address] [phone]









                          Date Inactivated          Comments



                           Code Status               Date Activated  

 

                          3/31/2015  3:21 PM         



                           Full Code                 3/23/2015  5:46 PM  









  



                           This code status was determined by:     Patient

## 2018-12-06 NOTE — XMS REPORT
Patient Summary Document

                             Created on: 2018



GLORIA DSOUZA

External Reference #: [id number]

: 1945

Sex: Female



Demographics







                          Address                   [address] [place]  [address]

 

                          Home Phone                [phone]

 

                          Preferred Language        Unknown

 

                          Marital Status            Unknown

 

                          Taoism Affiliation     Unknown

 

                          Race                      Unknown

 

                          Ethnic Group              Unknown





Author







                          Author                    [facility]

 

                          Address                   Unknown

 

                          Phone                     Unavailable







Care Team Providers







                    Care Team Member Name    Role                Phone

 

                    NAS SWENSON    Unavailable         Unavailable

 

                    KAMILLE FRIAS    Unavailable         Unavailable







Problems

This patient has no known problems.



Allergies, Adverse Reactions, Alerts

This patient has no known allergies or adverse reactions.



Medications

This patient has no known medications.



Results







           Test Description    Test Time    Test Comments    Text Results    Atomic Results    Result

 Comments

 

                POCT-CREATININE    2018 14:44:00                      

 

   

 

                POC-CREATININE (BEAKER) (test code=1859)    1.9 mg/dL       0.6-1.3         TESTED AT [facility] [address]

 [facility] [address]

 

                POC-EGFR (BEAKER) (test code=1860)    26 mL/min/1.73M2                     





BASIC METABOLIC [id number] 13:09:00* 





                Test Item       Value           Reference Range    Comments

 

                SODIUM (BEAKER) (test code=381)    135 meq/L       136-145          

 

                POTASSIUM (BEAKER) (test code=379)    5.1 meq/L       3.5-5.1          

 

                CHLORIDE (BEAKER) (test code=382)    105 meq/L                  

 

                CO2 (BEAKER) (test code=355)    23 meq/L        22-29            

 

                BLOOD UREA NITROGEN (BEAKER) (test code=354)    76 mg/dL        7-21             

 

                CREATININE (BEAKER) (test code=358)    1.84 mg/dL      0.57-1.25        

 

                GLUCOSE RANDOM (BEAKER) (test code=652)    85 mg/dL                   

 

                CALCIUM (BEAKER) (test code=697)    8.5 mg/dL       8.4-10.2         

 

                EGFR (BEAKER) (test code=1092)    27 mL/min/1.73 sq m                    ESTIMATED GFR IS NOT AS 

ACCURATE AS CREATININE CLEARANCE IN PREDICTING GLOMERULAR FILTRATION RATE. 
ESTIMATED GFR IS NOT APPLICABLE FOR DIALYSIS PATIENTS.





CBC W/PLT COUNT & AUTO [id number] 12:07:00* 





                Test Item       Value           Reference Range    Comments

 

                WHITE BLOOD CELL COUNT (BEAKER) (test code=775)    5.3 K/ L        4.0-10.0         

 

                RED BLOOD CELL COUNT (BEAKER) (test code=761)    3.46 M/ L       4.00-5.00        

 

                HEMOGLOBIN (BEAKER) (test code=410)    11.4 GM/DL      12.0-15.0        

 

                HEMATOCRIT (BEAKER) (test code=411)    33.9 %          36.0-45.0        

 

                MEAN CORPUSCULAR VOLUME (BEAKER) (test code=753)    98.0 fL         82.0-99.0        

 

                MEAN CORPUSCULAR HEMOGLOBIN (BEAKER) (test code=751)    32.8 pg         27.0-33.0        

 

                    MEAN CORPUSCULAR HEMOGLOBIN CONC (BEAKER) (test code=752)    33.5 GM/DL          32.0-36.0

                                         

 

                RED CELL DISTRIBUTION WIDTH (BEAKER) (test code=412)    12.9 %          10.3-14.2        

 

                PLATELET COUNT (BEAKER) (test code=756)    121 K/CU MM     150-430          

 

                MEAN PLATELET VOLUME (BEAKER) (test code=754)    7.2 fL          6.5-10.5         

 

                NUCLEATED RED BLOOD CELLS (BEAKER) (test code=413)    0 /100 WBC      0-0              

 

                NEUTROPHILS RELATIVE PERCENT (BEAKER) (test code=429)    70 %                             

 

                LYMPHOCYTES RELATIVE PERCENT (BEAKER) (test code=430)    18 %                             

 

                MONOCYTES RELATIVE PERCENT (BEAKER) (test code=431)    9 %                              

 

                EOSINOPHILS RELATIVE PERCENT (BEAKER) (test code=432)    3 %                              

 

                BASOPHILS RELATIVE PERCENT (BEAKER) (test code=437)    1 %                              

 

                NEUTROPHILS ABSOLUTE COUNT (BEAKER) (test code=670)    3.71 K/ L       1.80-8.00        

 

                LYMPHOCYTES ABSOLUTE COUNT (BEAKER) (test code=414)    0.93 K/ L       1.48-4.50        

 

                MONOCYTES ABSOLUTE COUNT (BEAKER) (test code=415)    0.47 K/ L       0.00-1.30        

 

                EOSINOPHILS ABSOLUTE COUNT (BEAKER) (test code=416)    0.17 K/ L       0.00-0.50        

 

                BASOPHILS ABSOLUTE COUNT (BEAKER) (test code=417)    0.04 K/ L       0.00-0.20        





0.00CBC W/PLT COUNT & AUTO [id number] 14:09:00* 





                Test Item       Value           Reference Range    Comments

 

                WHITE BLOOD CELL COUNT (BEAKER) (test code=775)    5.2 K/ L        4.0-10.0         

 

                RED BLOOD CELL COUNT (BEAKER) (test code=761)    3.47 M/ L       4.00-5.00        

 

                HEMOGLOBIN (BEAKER) (test code=410)    11.3 GM/DL      12.0-15.0        

 

                HEMATOCRIT (BEAKER) (test code=411)    34.6 %          36.0-45.0        

 

                MEAN CORPUSCULAR VOLUME (BEAKER) (test code=753)    99.7 fL         82.0-99.0        

 

                MEAN CORPUSCULAR HEMOGLOBIN (BEAKER) (test code=751)    32.6 pg         27.0-33.0        

 

                    MEAN CORPUSCULAR HEMOGLOBIN CONC (BEAKER) (test code=752)    32.7 GM/DL          32.0-36.0

                                         

 

                RED CELL DISTRIBUTION WIDTH (BEAKER) (test code=412)    13.1 %          10.3-14.2        

 

                PLATELET COUNT (BEAKER) (test code=756)    114 K/CU MM     150-430          

 

                MEAN PLATELET VOLUME (BEAKER) (test code=754)    7.4 fL          6.5-10.5         

 

                NUCLEATED RED BLOOD CELLS (BEAKER) (test code=413)    0 /100 WBC      0-0              

 

                NEUTROPHILS RELATIVE PERCENT (BEAKER) (test code=429)    69 %                             

 

                LYMPHOCYTES RELATIVE PERCENT (BEAKER) (test code=430)    18 %                             

 

                MONOCYTES RELATIVE PERCENT (BEAKER) (test code=431)    9 %                              

 

                EOSINOPHILS RELATIVE PERCENT (BEAKER) (test code=432)    3 %                              

 

                BASOPHILS RELATIVE PERCENT (BEAKER) (test code=437)    1 %                              

 

                NEUTROPHILS ABSOLUTE COUNT (BEAKER) (test code=670)    3.62 K/ L       1.80-8.00        

 

                LYMPHOCYTES ABSOLUTE COUNT (BEAKER) (test code=414)    0.92 K/ L       1.48-4.50        

 

                MONOCYTES ABSOLUTE COUNT (BEAKER) (test code=415)    0.47 K/ L       0.00-1.30        

 

                EOSINOPHILS ABSOLUTE COUNT (BEAKER) (test code=416)    0.18 K/ L       0.00-0.50        

 

                BASOPHILS ABSOLUTE COUNT (BEAKER) (test code=417)    0.04 K/ L       0.00-0.20        





0.00BASIC METABOLIC [id number] 13:56:00* 





                Test Item       Value           Reference Range    Comments

 

                SODIUM (BEAKER) (test code=381)    136 meq/L       136-145          

 

                POTASSIUM (BEAKER) (test code=379)    4.8 meq/L       3.5-5.1          

 

                CHLORIDE (BEAKER) (test code=382)    104 meq/L                  

 

                CO2 (BEAKER) (test code=355)    24 meq/L        22-29            

 

                BLOOD UREA NITROGEN (BEAKER) (test code=354)    55 mg/dL        7-21             

 

                CREATININE (BEAKER) (test code=358)    1.67 mg/dL      0.57-1.25        

 

                GLUCOSE RANDOM (BEAKER) (test code=652)    100 mg/dL                  

 

                CALCIUM (BEAKER) (test code=697)    8.3 mg/dL       8.4-10.2         

 

                EGFR (BEAKER) (test code=1092)    30 mL/min/1.73 sq m                    ESTIMATED GFR IS NOT AS 

ACCURATE AS CREATININE CLEARANCE IN PREDICTING GLOMERULAR FILTRATION RATE. 
ESTIMATED GFR IS NOT APPLICABLE FOR DIALYSIS PATIENTS.

## 2018-12-06 NOTE — DIAGNOSTIC IMAGING REPORT
EXAMINATION:  CXR 2 VIEW - HOPD    



INDICATION: Cough and congestion         



COMPARISON:  None

     

FINDINGS:  PA and lateral views



TUBES and LINES:  2-lead pacemaker device overlying the left upper chest with

leads overlying the right atrium and right ventricle.



LUNGS:  Lungs are well inflated.  Patchy airspace opacities in the left lower

lobe. Bandlike opacity in the right medial lower lobe with mild volume loss

appears to represent atelectasis.   Mild central pulmonary vascular congestion.

Few right upper lobe calcified granulomas.



PLEURA:  Small bilateral pleural effusions. No pneumothorax.



HEART AND MEDIASTINUM:  The cardiac silhouette is mildly prominent. The

pulmonary arteries appear enlarged and suggestive of pulmonary hypertension.



BONES AND SOFT TISSUES:  Mild multilevel degenerative changes of the thoracic

spine.  Soft tissues are unremarkable.



UPPER ABDOMEN: No free air under the diaphragm. 



IMPRESSION: 

Patchy airspace opacities in the left lower lobe may represent pneumonia in the

proper clinical setting. No prior images available for comparison. Recommend

follow-up chest radiograph in 4-6 weeks.



Bilateral central pulmonary vascular congestion.



Signed by: Dr. Deanne sAhley M.D. on 12/6/2018 4:42 PM

## 2022-05-31 ENCOUNTER — HOSPITAL ENCOUNTER (OUTPATIENT)
Dept: HOSPITAL 88 - DX | Age: 77
End: 2022-05-31
Attending: FAMILY MEDICINE
Payer: MEDICARE

## 2022-05-31 DIAGNOSIS — M85.88: Primary | ICD-10-CM

## 2022-05-31 PROCEDURE — 77080 DXA BONE DENSITY AXIAL: CPT

## 2025-03-16 ENCOUNTER — HOSPITAL ENCOUNTER (EMERGENCY)
Dept: HOSPITAL 88 - ER | Age: 80
Discharge: LEFT BEFORE DISCHARGE | End: 2025-03-16
Payer: MEDICARE

## 2025-03-16 ENCOUNTER — HOSPITAL ENCOUNTER (EMERGENCY)
Dept: HOSPITAL 88 - FSED | Age: 80
Discharge: HOME | End: 2025-03-16
Payer: MEDICARE

## 2025-03-16 VITALS — BODY MASS INDEX: 29.08 KG/M2 | WEIGHT: 170.31 LBS | HEIGHT: 64 IN

## 2025-03-16 VITALS — HEART RATE: 74 BPM | OXYGEN SATURATION: 99 % | TEMPERATURE: 97.3 F

## 2025-03-16 DIAGNOSIS — E78.5: ICD-10-CM

## 2025-03-16 DIAGNOSIS — I48.91: ICD-10-CM

## 2025-03-16 DIAGNOSIS — E87.6: ICD-10-CM

## 2025-03-16 DIAGNOSIS — N28.9: ICD-10-CM

## 2025-03-16 DIAGNOSIS — R60.9: Primary | ICD-10-CM

## 2025-03-16 DIAGNOSIS — Z95.810: ICD-10-CM

## 2025-03-16 DIAGNOSIS — R94.31: ICD-10-CM

## 2025-03-16 DIAGNOSIS — E88.09: ICD-10-CM

## 2025-03-16 DIAGNOSIS — I10: ICD-10-CM

## 2025-03-16 DIAGNOSIS — I50.9: ICD-10-CM

## 2025-03-16 PROCEDURE — 83880 ASSAY OF NATRIURETIC PEPTIDE: CPT

## 2025-03-16 PROCEDURE — 99284 EMERGENCY DEPT VISIT MOD MDM: CPT

## 2025-03-16 PROCEDURE — 82553 CREATINE MB FRACTION: CPT

## 2025-03-16 PROCEDURE — 93970 EXTREMITY STUDY: CPT

## 2025-03-16 PROCEDURE — 71046 X-RAY EXAM CHEST 2 VIEWS: CPT

## 2025-03-16 PROCEDURE — 85025 COMPLETE CBC W/AUTO DIFF WBC: CPT

## 2025-03-16 PROCEDURE — 93005 ELECTROCARDIOGRAM TRACING: CPT

## 2025-03-16 PROCEDURE — 80053 COMPREHEN METABOLIC PANEL: CPT

## 2025-03-16 PROCEDURE — 84484 ASSAY OF TROPONIN QUANT: CPT

## 2025-03-16 PROCEDURE — 81003 URINALYSIS AUTO W/O SCOPE: CPT

## 2025-03-16 PROCEDURE — 80076 HEPATIC FUNCTION PANEL: CPT

## 2025-03-16 RX ADMIN — POTASSIUM CHLORIDE STA MEQ: 1500 TABLET, EXTENDED RELEASE ORAL at 16:27
